# Patient Record
Sex: FEMALE | Race: WHITE | NOT HISPANIC OR LATINO | Employment: OTHER | URBAN - METROPOLITAN AREA
[De-identification: names, ages, dates, MRNs, and addresses within clinical notes are randomized per-mention and may not be internally consistent; named-entity substitution may affect disease eponyms.]

---

## 2020-12-16 ENCOUNTER — OFFICE VISIT (OUTPATIENT)
Dept: ENDOCRINOLOGY | Facility: CLINIC | Age: 55
End: 2020-12-16
Payer: COMMERCIAL

## 2020-12-16 VITALS
OXYGEN SATURATION: 100 % | HEART RATE: 74 BPM | DIASTOLIC BLOOD PRESSURE: 80 MMHG | BODY MASS INDEX: 28.74 KG/M2 | WEIGHT: 172.5 LBS | HEIGHT: 65 IN | SYSTOLIC BLOOD PRESSURE: 122 MMHG

## 2020-12-16 DIAGNOSIS — E03.8 HYPOTHYROIDISM DUE TO HASHIMOTO'S THYROIDITIS: ICD-10-CM

## 2020-12-16 DIAGNOSIS — E03.9 ACQUIRED HYPOTHYROIDISM: Primary | ICD-10-CM

## 2020-12-16 DIAGNOSIS — E06.3 HYPOTHYROIDISM DUE TO HASHIMOTO'S THYROIDITIS: ICD-10-CM

## 2020-12-16 PROCEDURE — 99203 OFFICE O/P NEW LOW 30 MIN: CPT | Mod: S$GLB,,, | Performed by: INTERNAL MEDICINE

## 2020-12-16 PROCEDURE — 99203 PR OFFICE/OUTPT VISIT, NEW, LEVL III, 30-44 MIN: ICD-10-PCS | Mod: S$GLB,,, | Performed by: INTERNAL MEDICINE

## 2020-12-16 PROCEDURE — 99999 PR PBB SHADOW E&M-NEW PATIENT-LVL III: ICD-10-PCS | Mod: PBBFAC,,, | Performed by: INTERNAL MEDICINE

## 2020-12-16 PROCEDURE — 99999 PR PBB SHADOW E&M-NEW PATIENT-LVL III: CPT | Mod: PBBFAC,,, | Performed by: INTERNAL MEDICINE

## 2020-12-16 RX ORDER — CIPROFLOXACIN 500 MG/1
TABLET ORAL
COMMUNITY
Start: 2020-12-11

## 2020-12-16 RX ORDER — LEVOTHYROXINE, LIOTHYRONINE 19; 4.5 UG/1; UG/1
TABLET ORAL
COMMUNITY
Start: 2020-11-15 | End: 2020-12-17 | Stop reason: SDUPTHER

## 2020-12-16 RX ORDER — CLOBETASOL PROPIONATE 0.5 MG/G
OINTMENT TOPICAL
COMMUNITY
Start: 2020-11-19

## 2020-12-16 RX ORDER — INDOMETHACIN 75 MG/1
75 CAPSULE, EXTENDED RELEASE ORAL 2 TIMES DAILY
COMMUNITY
Start: 2020-12-01

## 2020-12-16 RX ORDER — CELECOXIB 200 MG/1
CAPSULE ORAL
COMMUNITY
Start: 2020-12-11

## 2020-12-16 NOTE — ASSESSMENT & PLAN NOTE
Overall, clinically pt euthyroid. Has some chronic sx's of hypothyroidism for years. Recheck TFTs now and adjust current dose of thyroid medication prn.     Last thyroid US without nodules. No indication for repat thyroid US at this time. If swallowing symptoms worsen, could consider repeat US.

## 2020-12-16 NOTE — PROGRESS NOTES
"    Subjective:    Patient ID:  Tatianna Sue is a 55 y.o. female.    Chief Complaint:  Hashimoto's Thyroiditis (About [id number], did thyroid US results showed goiter & nodules consistent w/ Hashimoto's. Previous endo ordered labs that confirmed Hashimotos, was prescribed thyroid hormone (Synthroid & Cytomel). Eventually got off medications, [facility] of network. Need to establish care.)      Pt presents to establish care for hypothyroidism and review of chronic medical conditions as listed in the visit diagnosis section of this encounter.     With regards to hypothyroidism:    Diagnosed with hashimoto's in the early 2000's.     Current medication:  NP 45 mg daily. Takes thyroid medication properly without food first thing in the morning.    In the past took WP thyroid but it was recently recalled. Saw " providers" in the past. Last TFTs were > 1 year ago.      Current symptoms:   weight gain +ve for years  fatigue +ve, since 2016. Notes this is after she "fell out of shape." Denies snoring.  constipation +ve, chronic for years  hair loss -ve  brittle nails -ve  mental fog +ve, "all her life"    No hoarseness, voice changes. Has some issues swallowing large pills but does so with a large glass of water and then does not have an issues.  No compressive symptoms, or head/neck exposure to XRT. No personal or FH of thyroid cancer or MEN syndrome. Mom has hypothyroidism.    Reviewed out records. Pt brought thyroid US from [facility] in 2006  Findings consistent with Hashimoto's thyroiditis without any discrete nodules. Nl size thyroid.      Review of Systems   Constitutional: Positive for fatigue. Negative for unexpected weight change.   HENT: Negative for trouble swallowing and voice change.    Eyes: Negative for visual disturbance.   Respiratory: Negative for shortness of breath.    Cardiovascular: Negative for chest pain.   Gastrointestinal: Positive for constipation. Negative for abdominal " "pain, nausea and vomiting.   Endocrine: Positive for cold intolerance. Negative for heat intolerance.   Musculoskeletal: Negative for myalgias.   Skin: Negative for rash.   Neurological: Negative for headaches.   Hematological: Negative for adenopathy.   Psychiatric/Behavioral: Positive for sleep disturbance. Negative for dysphoric mood.        Past Medical History:   Diagnosis Date    Hypothyroidism     Thyroid nodule       Social History     Tobacco Use    Smoking status: Never Smoker   Substance Use Topics    Alcohol use: Yes     Frequency: Monthly or less     Drinks per session: 1 or 2    Drug use: Never     Family History   Problem Relation Age of Onset    Hypothyroidism Mother       History reviewed. No pertinent surgical history.       Current Outpatient Medications:     celecoxib (CELEBREX) 200 MG capsule, , Disp: , Rfl:     ciprofloxacin HCl (CIPRO) 500 MG tablet, , Disp: , Rfl:     clobetasol 0.05% (TEMOVATE) 0.05 % Oint, APPLY TO AFFECTED AREA ON TWICE DAILY UNDER BANDAID, Disp: , Rfl:     indomethacin (INDOCIN SR) 75 mg CpSR CR capsule, Take 75 mg by mouth 2 (two) times daily., Disp: , Rfl:     NP THYROID 30 mg Tab, , Disp: , Rfl:      Review of patient's allergies indicates:   Allergen Reactions    Penicillin g         Objective:   /80   Pulse 74   Ht 5' 5" (1.651 m)   Wt 78.3 kg (172 lb 8.2 oz)   SpO2 100%   BMI 28.71 kg/m²   BP Readings from Last 3 Encounters:   12/16/20 122/80     Wt Readings from Last 3 Encounters:   12/16/20 1003 78.3 kg (172 lb 8.2 oz)          Physical Exam  Vitals signs reviewed.   Constitutional:       General: She is not in acute distress.     Appearance: Normal appearance. She is well-developed. She is not ill-appearing, toxic-appearing or diaphoretic.   HENT:      Head: Normocephalic and atraumatic.      Nose: Nose normal.   Eyes:      General: No scleral icterus.  Neck:      Trachea: No tracheal deviation.      Comments:  No thyromegaly or palpable " thyroid nodules    Cardiovascular:      Rate and Rhythm: Normal rate and regular rhythm.      Heart sounds: Normal heart sounds. No murmur.   Pulmonary:      Effort: Pulmonary effort is normal. No respiratory distress.      Breath sounds: Normal breath sounds. No wheezing or rales.   Abdominal:      General: Bowel sounds are normal. There is no distension.      Palpations: Abdomen is soft.      Tenderness: There is no abdominal tenderness.   Musculoskeletal:         General: No swelling.   Lymphadenopathy:      Cervical: No cervical adenopathy.   Skin:     General: Skin is warm.   Neurological:      Mental Status: She is alert and oriented to person, place, and time.      Cranial Nerves: No cranial nerve deficit.      Deep Tendon Reflexes: Reflexes normal.   Psychiatric:         Thought Content: Thought content normal.           No results found for: NA, K, CL, CO2, BUN, CREATININE, GLU, HGBA1C, MG, AST, ALT, ALBUMIN, PROT, BILITOT, WBC, HGB, HCT, MCV, MCH, PLT, MPV, GRAN, LYMPH, CHOL, HDL, LDLCALC, TRIG    Lab Results   Component Value Date    TSH 3.17 12/16/2020    [id number] 109 12/16/2020        Thyroid Labs Latest Ref Rng & Units 12/16/2020   TSH mIU/L 3.17         No results found for: HGBA1C      Assessment and plan:     Problem List Items Addressed This Visit        Endocrine    Hypothyroidism due to Hashimoto's thyroiditis - Primary    Current Assessment & Plan     Overall, clinically pt euthyroid. Has some chronic sx's of hypothyroidism for years. Recheck TFTs now and adjust current dose of thyroid medication prn.     Last thyroid US without nodules. No indication for repat thyroid US at this time. If swallowing symptoms worsen, could consider repeat US.         Relevant Orders    TSH    T3 (Completed)    T4, Free (Completed)        Labs now    RTC in 1 year or prn

## 2020-12-17 ENCOUNTER — PATIENT MESSAGE (OUTPATIENT)
Dept: ENDOCRINOLOGY | Facility: CLINIC | Age: 55
End: 2020-12-17

## 2020-12-17 LAB
T3 SERPL-MCNC: 109 NG/DL (ref 76–181)
T4 FREE SERPL-MCNC: 0.9 NG/DL (ref 0.8–1.8)
TSH SERPL-ACNC: 3.17 MIU/L

## 2020-12-17 RX ORDER — THYROID 30 MG/1
TABLET ORAL
Qty: 135 TABLET | Refills: 3 | Status: SHIPPED | OUTPATIENT
Start: 2020-12-17

## 2020-12-17 RX ORDER — LEVOTHYROXINE, LIOTHYRONINE 19; 4.5 UG/1; UG/1
TABLET ORAL
Qty: 135 TABLET | Refills: 3 | Status: SHIPPED | OUTPATIENT
Start: 2020-12-17 | End: 2020-12-17 | Stop reason: SDUPTHER

## 2021-06-29 ENCOUNTER — IMMUNIZATION (OUTPATIENT)
Dept: FAMILY MEDICINE | Facility: CLINIC | Age: 56
End: 2021-06-29
Payer: COMMERCIAL

## 2021-06-29 DIAGNOSIS — Z23 NEED FOR VACCINATION: Primary | ICD-10-CM

## 2021-06-29 PROCEDURE — 0031A COVID-19,VECTOR-NR,RS-AD26,PF,0.5 ML DOSE VACCINE (JANSSEN): CPT | Mod: PBBFAC | Performed by: INTERNAL MEDICINE
